# Patient Record
Sex: MALE | Race: WHITE | NOT HISPANIC OR LATINO | Employment: UNEMPLOYED | URBAN - METROPOLITAN AREA
[De-identification: names, ages, dates, MRNs, and addresses within clinical notes are randomized per-mention and may not be internally consistent; named-entity substitution may affect disease eponyms.]

---
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
## 2024-06-16 ENCOUNTER — APPOINTMENT (EMERGENCY)
Dept: ULTRASOUND IMAGING | Facility: HOSPITAL | Age: 19
End: 2024-06-16
Payer: COMMERCIAL

## 2024-06-16 ENCOUNTER — HOSPITAL ENCOUNTER (EMERGENCY)
Facility: HOSPITAL | Age: 19
Discharge: HOME | End: 2024-06-16
Attending: EMERGENCY MEDICINE
Payer: COMMERCIAL

## 2024-06-16 VITALS
WEIGHT: 190.2 LBS | HEIGHT: 69 IN | TEMPERATURE: 98.1 F | HEART RATE: 66 BPM | SYSTOLIC BLOOD PRESSURE: 153 MMHG | DIASTOLIC BLOOD PRESSURE: 83 MMHG | OXYGEN SATURATION: 100 % | RESPIRATION RATE: 16 BRPM | BODY MASS INDEX: 28.17 KG/M2

## 2024-06-16 DIAGNOSIS — N50.3 EPIDIDYMAL CYST: Primary | ICD-10-CM

## 2024-06-16 LAB
BACTERIA UR QL AUTO: ABNORMAL /HPF
BILIRUB UR QL STRIP: NEGATIVE
CAOX CRY URNS QL MICRO: ABNORMAL /HPF
CLARITY UR: CLEAR
COLOR UR: YELLOW
GLUCOSE UR STRIP-MCNC: NEGATIVE MG/DL
HGB UR QL STRIP.AUTO: NEGATIVE
KETONES UR STRIP-MCNC: NEGATIVE MG/DL
LEUKOCYTE ESTERASE UR QL STRIP: NEGATIVE
MUCOUS THREADS UR QL AUTO: ABNORMAL
NITRITE UR QL STRIP: NEGATIVE
NON-SQ EPI CELLS URNS QL MICRO: ABNORMAL /HPF
PH UR STRIP.AUTO: 6 [PH]
PROT UR STRIP-MCNC: ABNORMAL MG/DL
RBC #/AREA URNS AUTO: ABNORMAL /HPF
SP GR UR STRIP.AUTO: 1.03 (ref 1–1.03)
UROBILINOGEN UR STRIP-ACNC: <2 MG/DL
WBC #/AREA URNS AUTO: ABNORMAL /HPF

## 2024-06-16 PROCEDURE — 99284 EMERGENCY DEPT VISIT MOD MDM: CPT

## 2024-06-16 PROCEDURE — 99284 EMERGENCY DEPT VISIT MOD MDM: CPT | Performed by: EMERGENCY MEDICINE

## 2024-06-16 PROCEDURE — 81001 URINALYSIS AUTO W/SCOPE: CPT | Performed by: EMERGENCY MEDICINE

## 2024-06-16 PROCEDURE — 76870 US EXAM SCROTUM: CPT

## 2024-06-16 RX ORDER — IBUPROFEN 600 MG/1
600 TABLET ORAL EVERY 8 HOURS PRN
Qty: 30 TABLET | Refills: 0 | Status: SHIPPED | OUTPATIENT
Start: 2024-06-16

## 2024-06-16 RX ORDER — IBUPROFEN 600 MG/1
600 TABLET ORAL ONCE
Status: COMPLETED | OUTPATIENT
Start: 2024-06-16 | End: 2024-06-16

## 2024-06-16 RX ADMIN — IBUPROFEN 600 MG: 600 TABLET, FILM COATED ORAL at 20:18

## 2024-06-16 NOTE — ED PROVIDER NOTES
"Pt Name: Dixon Buck  MRN: [id number]  Birthdate 2005  Age/Sex: 19 y.o. male  Date of evaluation: 6/16/2024  PCP: No primary care provider on file.    CHIEF COMPLAINT    Chief Complaint   Patient presents with    Penis / Scrotum Problem     Reports a \"ball on my right ball.\" Reports been there 3+ mo but today it is painful. C/o \"dribbling\" when urinating.          HPI and MDM    19 y.o. male presenting with right scrotal mass.  States he has been feeling it for 2 to 3 years, thinks it has gotten bigger since then.  Over the last week he has been causing pain.  States he thinks he may have hit it but does not recall any inciting trauma.  Denies any scrotal swelling or skin changes.  He denies any urinary symptoms to me.  No abdominal pain, no fevers or chills.      Differential diagnosis considered includes but not limited to mass, varicocele, hydrocele, cyst.    UA not concerning for infection.  Ultrasound results as below.  Patient updated with all results.  Advise urology follow-up, return precautions were discussed.            Medications   ibuprofen (MOTRIN) tablet 600 mg (600 mg Oral Given 6/16/24 2018)         Past Medical and Surgical History    History reviewed. No pertinent past medical history.    History reviewed. No pertinent surgical history.    History reviewed. No pertinent family history.    Social History     Tobacco Use    Smoking status: Never    Smokeless tobacco: Never   Vaping Use    Vaping status: Never Used   Substance Use Topics    Alcohol use: Never    Drug use: Not Currently           Allergies    No Known Allergies    Home Medications    Prior to Admission medications    Medication Sig Start Date End Date Taking? Authorizing Provider   Multiple Vitamins-Minerals (AIRBORNE PO) Take by mouth    Historical Provider, MD           Physical Exam      ED Triage Vitals [06/16/24 1736]   Temperature Pulse Respirations Blood Pressure SpO2   98.1 °F (36.7 °C) 66 16 153/83 100 %      Temp " Source Heart Rate Source Patient Position - Orthostatic VS BP Location FiO2 (%)   Oral Monitor Sitting Left arm --      Pain Score       --               Physical Exam  Constitutional:       General: He is not in acute distress.     Appearance: He is not ill-appearing.   HENT:      Head: Normocephalic and atraumatic.      Nose: Nose normal.      Mouth/Throat:      Mouth: Mucous membranes are moist.   Eyes:      Extraocular Movements: Extraocular movements intact.      Pupils: Pupils are equal, round, and reactive to light.   Cardiovascular:      Rate and Rhythm: Normal rate and regular rhythm.   Pulmonary:      Effort: Pulmonary effort is normal. No respiratory distress.   Abdominal:      General: There is no distension.   Genitourinary:     Testes: Normal.      Comments: Normal testicular lie, separate from the right testicle there is a small rounded mass that is palpated, patient states it is tender.  Musculoskeletal:         General: No swelling or deformity. Normal range of motion.      Cervical back: Normal range of motion and neck supple.   Skin:     General: Skin is warm.      Findings: No erythema.   Neurological:      Mental Status: He is alert and oriented to person, place, and time. Mental status is at baseline.              Diagnostic Results      Labs:    Results Reviewed       Procedure Component Value Units Date/Time    Urine Microscopic [[id number]]  (Abnormal) Collected: 06/16/24 1918    Lab Status: Final result Specimen: Urine, Clean Catch Updated: 06/16/24 1949     RBC, UA None Seen /hpf      WBC, UA 1-2 /hpf      Epithelial Cells None Seen /hpf      Bacteria, UA None Seen /hpf      MUCUS THREADS Moderate     Ca Oxalate Raquel, UA Occasional /hpf     UA (URINE) with reflex to Scope [[id number]]  (Abnormal) Collected: 06/16/24 1918    Lab Status: Final result Specimen: Urine, Clean Catch Updated: 06/16/24 1942     Color, UA Yellow     Clarity, UA Clear     Specific Gravity, UA 1.032     pH, UA 6.0      Leukocytes, UA Negative     Nitrite, UA Negative     Protein, UA Trace mg/dl      Glucose, UA Negative mg/dl      Ketones, UA Negative mg/dl      Urobilinogen, UA <2.0 mg/dl      Bilirubin, UA Negative     Occult Blood, UA Negative            All labs reviewed and utilized in the medical decision making process    Radiology:    US scrotum and testicles   Final Result      Corresponding to the palpable abnormality is a epididymal head cyst measuring 1.0 x 1.5 x 0.9 cm.      Normal testicles.         Workstation performed: [id number]             All radiology studies independently viewed by me and interpreted by the radiologist.    Procedure    Procedures        FINAL IMPRESSION    Final diagnoses:   Epididymal cyst         DISPOSITION    Time reflects when diagnosis was documented in both MDM as applicable and the Disposition within this note       Time User Action Codes Description Comment    6/16/2024  8:05 PM Linda Garcia Add [N50.3] Epididymal cyst           ED Disposition       ED Disposition   Discharge    Condition   Stable    Date/Time   Sun Jun 16, 2024  8:05 PM    Comment   Dixon Buck discharge to home/self care.                   Follow-up Information       Follow up With Specialties Details Why Contact Info Additional Information    [facility] [place] [facility] Call in 1 day  [address]  [address]  [facility] [address]  [phone] [facility] [place], [address], [address], [place], [place], [address]   [phone]              PATIENT REFERRED TO:    [facility] [place]  [address]  [address]  [facility] [address]  [phone]  Call in 1 day        DISCHARGE MEDICATIONS:    Discharge Medication List as of 6/16/2024  8:06 PM        CONTINUE these medications which have NOT CHANGED    Details   Multiple Vitamins-Minerals (AIRBORNE PO) Take by mouth, Historical Med             No discharge procedures  on file.         Linda Garcia DO        This note was partially completed using voice recognition technology, and was scanned for gross errors; however some errors may still exist. Please contact the author with any questions or requests for clarification.      Linda Garcia DO  06/17/24 3676

## 2024-06-20 ENCOUNTER — OFFICE VISIT (OUTPATIENT)
Dept: INTERNAL MEDICINE CLINIC | Facility: CLINIC | Age: 19
End: 2024-06-20
Payer: COMMERCIAL

## 2024-06-20 ENCOUNTER — APPOINTMENT (OUTPATIENT)
Dept: LAB | Facility: HOSPITAL | Age: 19
End: 2024-06-20
Payer: COMMERCIAL

## 2024-06-20 VITALS
OXYGEN SATURATION: 97 % | SYSTOLIC BLOOD PRESSURE: 114 MMHG | WEIGHT: 191.2 LBS | HEIGHT: 69 IN | HEART RATE: 67 BPM | DIASTOLIC BLOOD PRESSURE: 72 MMHG | BODY MASS INDEX: 28.32 KG/M2 | TEMPERATURE: 98.1 F | RESPIRATION RATE: 18 BRPM

## 2024-06-20 DIAGNOSIS — R10.31 RLQ ABDOMINAL PAIN: Primary | ICD-10-CM

## 2024-06-20 DIAGNOSIS — Z13.6 SCREENING FOR HEART DISEASE: ICD-10-CM

## 2024-06-20 DIAGNOSIS — R10.31 RIGHT INGUINAL PAIN: ICD-10-CM

## 2024-06-20 DIAGNOSIS — R10.31 RLQ ABDOMINAL PAIN: ICD-10-CM

## 2024-06-20 DIAGNOSIS — R10.32 LLQ ABDOMINAL PAIN: ICD-10-CM

## 2024-06-20 DIAGNOSIS — R10.2 SUPRAPUBIC PAIN: ICD-10-CM

## 2024-06-20 LAB
ALBUMIN SERPL BCG-MCNC: 4.6 G/DL (ref 3.5–5)
ALP SERPL-CCNC: 73 U/L (ref 34–104)
ALT SERPL W P-5'-P-CCNC: 21 U/L (ref 7–52)
ANION GAP SERPL CALCULATED.3IONS-SCNC: 6 MMOL/L (ref 4–13)
AST SERPL W P-5'-P-CCNC: 14 U/L (ref 13–39)
BASOPHILS # BLD AUTO: 0.03 THOUSANDS/ÂΜL (ref 0–0.1)
BASOPHILS NFR BLD AUTO: 1 % (ref 0–1)
BILIRUB SERPL-MCNC: 0.67 MG/DL (ref 0.2–1)
BUN SERPL-MCNC: 14 MG/DL (ref 5–25)
CALCIUM SERPL-MCNC: 9.6 MG/DL (ref 8.4–10.2)
CHLORIDE SERPL-SCNC: 104 MMOL/L (ref 96–108)
CHOLEST SERPL-MCNC: 135 MG/DL
CO2 SERPL-SCNC: 28 MMOL/L (ref 21–32)
CREAT SERPL-MCNC: 0.96 MG/DL (ref 0.6–1.3)
EOSINOPHIL # BLD AUTO: 0.15 THOUSAND/ÂΜL (ref 0–0.61)
EOSINOPHIL NFR BLD AUTO: 3 % (ref 0–6)
ERYTHROCYTE [DISTWIDTH] IN BLOOD BY AUTOMATED COUNT: 11.8 % (ref 11.6–15.1)
GFR SERPL CREATININE-BSD FRML MDRD: 112 ML/MIN/1.73SQ M
GLUCOSE P FAST SERPL-MCNC: 95 MG/DL (ref 65–99)
HCT VFR BLD AUTO: 42.8 % (ref 36.5–49.3)
HDLC SERPL-MCNC: 35 MG/DL
HGB BLD-MCNC: 14.8 G/DL (ref 12–17)
IMM GRANULOCYTES # BLD AUTO: 0.01 THOUSAND/UL (ref 0–0.2)
IMM GRANULOCYTES NFR BLD AUTO: 0 % (ref 0–2)
LDLC SERPL CALC-MCNC: 62 MG/DL (ref 0–100)
LYMPHOCYTES # BLD AUTO: 1.84 THOUSANDS/ÂΜL (ref 0.6–4.47)
LYMPHOCYTES NFR BLD AUTO: 37 % (ref 14–44)
MCH RBC QN AUTO: 30.1 PG (ref 26.8–34.3)
MCHC RBC AUTO-ENTMCNC: 34.6 G/DL (ref 31.4–37.4)
MCV RBC AUTO: 87 FL (ref 82–98)
MONOCYTES # BLD AUTO: 0.52 THOUSAND/ÂΜL (ref 0.17–1.22)
MONOCYTES NFR BLD AUTO: 10 % (ref 4–12)
NEUTROPHILS # BLD AUTO: 2.48 THOUSANDS/ÂΜL (ref 1.85–7.62)
NEUTS SEG NFR BLD AUTO: 49 % (ref 43–75)
NONHDLC SERPL-MCNC: 100 MG/DL
NRBC BLD AUTO-RTO: 0 /100 WBCS
PLATELET # BLD AUTO: 290 THOUSANDS/UL (ref 149–390)
PMV BLD AUTO: 10.6 FL (ref 8.9–12.7)
POTASSIUM SERPL-SCNC: 4.3 MMOL/L (ref 3.5–5.3)
PROT SERPL-MCNC: 7.5 G/DL (ref 6.4–8.4)
RBC # BLD AUTO: 4.92 MILLION/UL (ref 3.88–5.62)
SODIUM SERPL-SCNC: 138 MMOL/L (ref 135–147)
TRIGL SERPL-MCNC: 190 MG/DL
WBC # BLD AUTO: 5.03 THOUSAND/UL (ref 4.31–10.16)

## 2024-06-20 PROCEDURE — 80053 COMPREHEN METABOLIC PANEL: CPT

## 2024-06-20 PROCEDURE — 99203 OFFICE O/P NEW LOW 30 MIN: CPT | Performed by: PHYSICIAN ASSISTANT

## 2024-06-20 PROCEDURE — 80061 LIPID PANEL: CPT

## 2024-06-20 PROCEDURE — 85025 COMPLETE CBC W/AUTO DIFF WBC: CPT

## 2024-06-20 PROCEDURE — 36415 COLL VENOUS BLD VENIPUNCTURE: CPT

## 2024-06-20 NOTE — PATIENT INSTRUCTIONS
Currently inguinal pain is present on the suprapubic ramus.  Also present in both lower quadrants.  Suspect right inguinal hernia.  Will have labs checked, will refer to general surgery, will have CT of the abdomen and pelvis done.  We will discuss all results when available.  Schedule follow-up 1 month, sooner as needed.  Give trial of Advil, 2 tablets 3 times a day for 5 days.

## 2024-06-20 NOTE — PROGRESS NOTES
"Assessment/Plan:   Patient Instructions   Currently inguinal pain is present on the suprapubic ramus.  Also present in both lower quadrants.  Suspect right inguinal hernia.  Will have labs checked, will refer to general surgery, will have CT of the abdomen and pelvis done.  We will discuss all results when available.  Schedule follow-up 1 month, sooner as needed.  Give trial of Advil, 2 tablets 3 times a day for 5 days.    Quality Measures:   Depression Screening and Follow-up Plan: Patient was screened for depression during today's encounter. They screened negative with a PHQ-2 score of 2.         Return in about 4 weeks (around 7/18/2024) for Next scheduled follow up.         Diagnoses and all orders for this visit:    RLQ abdominal pain  -     CBC and differential; Future  -     Comprehensive metabolic panel; Future  -     CT abdomen pelvis w wo contrast; Future    LLQ abdominal pain  -     CT abdomen pelvis w wo contrast; Future    Suprapubic pain  -     CT abdomen pelvis w wo contrast; Future    Screening for heart disease  -     Lipid panel; Future    Right inguinal pain  -     Ambulatory referral to General Surgery; Future          Subjective:      Patient ID: Dixon Buck is a 19 y.o. male.    19-year-old male presents to establish with this practice but for an acute concern of right inguinal pain, right testicular pain he is stating for \"a long time\".  States he is sexually active and pain is present during and after intercourse.  He has no symptoms of STD.  He has had 2 ultrasounds done, 1 in 2021, and 1 recently at the ER.  Both  small epididymal cysts but the testicles look normal.  No dysuria, no gross hematuria, no urgency or frequency.  On further questioning patient does ride dirt bikes frequently, and notes pain after doing so.    Remainder of health history is unremarkable.    History/PE as documented in the EMR.        ALLERGIES:  No Known Allergies    CURRENT MEDICATIONS:    Current " Outpatient Medications:     ibuprofen (MOTRIN) 600 mg tablet, Take 1 tablet (600 mg total) by mouth every 8 (eight) hours as needed for moderate pain, Disp: 30 tablet, Rfl: 0    Multiple Vitamins-Minerals (AIRBORNE PO), Take by mouth, Disp: , Rfl:     ACTIVE PROBLEM LIST:  There is no problem list on file for this patient.      PAST MEDICAL HISTORY:  History reviewed. No pertinent past medical history.    PAST SURGICAL HISTORY:  History reviewed. No pertinent surgical history.    FAMILY HISTORY:  Family History   Problem Relation Age of Onset    No Known Problems Mother     No Known Problems Father     No Known Problems Brother     Colon cancer Neg Hx     Breast cancer Neg Hx     Prostate cancer Neg Hx        SOCIAL HISTORY:  Social History     Socioeconomic History    Marital status: Single     Spouse name: Not on file    Number of children: Not on file    Years of education: Not on file    Highest education level: Not on file   Occupational History    Not on file   Tobacco Use    Smoking status: Never    Smokeless tobacco: Never   Vaping Use    Vaping status: Never Used   Substance and Sexual Activity    Alcohol use: Never    Drug use: Not Currently    Sexual activity: Yes     Partners: Female   Other Topics Concern    Not on file   Social History Narrative    Girlfriend    Brushes teeth twice daily    Hobbies-computers, riding dirt bikes     Social Determinants of Health     Financial Resource Strain: Not on file   Food Insecurity: Not on file   Transportation Needs: Not on file   Physical Activity: Not on file   Stress: Not on file   Social Connections: Not on file   Intimate Partner Violence: Not on file   Housing Stability: Not on file       Review of Systems   Constitutional:  Negative for activity change, chills, fatigue and fever.   HENT:  Negative for congestion.    Eyes:  Negative for discharge.   Respiratory:  Negative for cough, chest tightness and shortness of breath.    Cardiovascular:  Negative for  "chest pain, palpitations and leg swelling.   Gastrointestinal:  Negative for abdominal pain, blood in stool, constipation, diarrhea, nausea and vomiting.   Endocrine: Negative for polydipsia, polyphagia and polyuria.   Genitourinary:  Positive for testicular pain. Negative for difficulty urinating, dysuria, flank pain, frequency, hematuria, penile discharge, penile pain, penile swelling, scrotal swelling and urgency.   Musculoskeletal:  Negative for arthralgias and myalgias.   Skin:  Negative for rash.   Allergic/Immunologic: Negative for immunocompromised state.   Neurological:  Negative for dizziness, syncope, weakness, light-headedness and headaches.   Hematological:  Negative for adenopathy. Does not bruise/bleed easily.   Psychiatric/Behavioral:  Negative for dysphoric mood, sleep disturbance and suicidal ideas. The patient is not nervous/anxious.          Objective:  Vitals:    06/20/24 0830   BP: 114/72   BP Location: Right arm   Patient Position: Sitting   Cuff Size: Standard   Pulse: 67   Resp: 18   Temp: 98.1 °F (36.7 °C)   TempSrc: Tympanic   SpO2: 97%   Weight: 86.7 kg (191 lb 3.2 oz)   Height: 5' 9\" (1.753 m)     Body mass index is 28.24 kg/m².     Physical Exam  Vitals and nursing note reviewed.   Constitutional:       General: He is not in acute distress.     Appearance: Normal appearance. He is well-developed.   HENT:      Head: Normocephalic.      Right Ear: Tympanic membrane, ear canal and external ear normal.      Left Ear: Tympanic membrane, ear canal and external ear normal.      Nose: Nose normal.      Mouth/Throat:      Mouth: Mucous membranes are moist.      Pharynx: Oropharynx is clear. No oropharyngeal exudate.   Eyes:      General: No scleral icterus.        Right eye: No discharge.         Left eye: No discharge.      Extraocular Movements: Extraocular movements intact.      Conjunctiva/sclera: Conjunctivae normal.      Pupils: Pupils are equal, round, and reactive to light.   Neck:      " Thyroid: No thyromegaly.      Vascular: No carotid bruit or JVD.      Trachea: No tracheal deviation.   Cardiovascular:      Rate and Rhythm: Normal rate and regular rhythm.      Pulses: Normal pulses.      Heart sounds: Normal heart sounds. No murmur heard.     No friction rub. No gallop.   Pulmonary:      Effort: Pulmonary effort is normal. No respiratory distress.      Breath sounds: Normal breath sounds. No wheezing or rales.   Chest:      Chest wall: No tenderness.   Abdominal:      General: Bowel sounds are normal. There is no distension.      Palpations: Abdomen is soft. There is no hepatomegaly, splenomegaly or mass.      Tenderness: There is abdominal tenderness in the right lower quadrant and left lower quadrant. There is no right CVA tenderness, left CVA tenderness, guarding or rebound.      Hernia: A hernia is present. Hernia is present in the right inguinal area.   Genitourinary:     Penis: Normal.       Testes: Normal.      Comments: Acute tenderness on pressing the pubic ramus.  Open left inguinal ring.  Questionable right inguinal hernia  Musculoskeletal:         General: No tenderness or deformity. Normal range of motion.      Cervical back: Normal range of motion and neck supple.      Right lower leg: No edema.      Left lower leg: No edema.   Lymphadenopathy:      Cervical: No cervical adenopathy.   Skin:     General: Skin is warm and dry.      Findings: No rash.   Neurological:      General: No focal deficit present.      Mental Status: He is alert and oriented to person, place, and time.      Cranial Nerves: No cranial nerve deficit.      Sensory: No sensory deficit.      Motor: No weakness or abnormal muscle tone.      Coordination: Coordination normal.      Gait: Gait normal.      Deep Tendon Reflexes: Reflexes are normal and symmetric. Reflexes normal.   Psychiatric:         Mood and Affect: Mood normal.         Behavior: Behavior normal.         Thought Content: Thought content normal.        "  Judgment: Judgment normal.           RESULTS:  No results found for: \"HGBA1C\", \"CHOLESTEROL\", \"TRIG\", \"HDL\", \"LDLCALC\", \"HGB\", \"HCT\", \"PLT\", \"PSA\", \"[id number]\", \"FREET4\", \"SODIUM\", \"BUN\", \"CREATININE\"   In chart    This note was created with voice recognition software.  Phonic, grammatical and spelling errors may be present within the note as a result.    "

## 2024-06-26 ENCOUNTER — HOSPITAL ENCOUNTER (OUTPATIENT)
Dept: CT IMAGING | Facility: HOSPITAL | Age: 19
Discharge: HOME | End: 2024-06-26
Payer: COMMERCIAL

## 2024-06-26 DIAGNOSIS — R10.32 LLQ ABDOMINAL PAIN: ICD-10-CM

## 2024-06-26 DIAGNOSIS — R10.31 RLQ ABDOMINAL PAIN: ICD-10-CM

## 2024-06-26 DIAGNOSIS — R10.2 SUPRAPUBIC PAIN: ICD-10-CM

## 2024-06-26 PROCEDURE — 74177 CT ABD & PELVIS W/CONTRAST: CPT

## 2024-06-26 RX ADMIN — IOHEXOL 100 ML: 350 INJECTION, SOLUTION INTRAVENOUS at 12:56

## 2024-06-27 ENCOUNTER — CONSULT (OUTPATIENT)
Dept: SURGERY | Facility: CLINIC | Age: 19
End: 2024-06-27
Payer: COMMERCIAL

## 2024-06-27 VITALS
DIASTOLIC BLOOD PRESSURE: 78 MMHG | WEIGHT: 189 LBS | OXYGEN SATURATION: 98 % | HEIGHT: 69 IN | TEMPERATURE: 97.7 F | BODY MASS INDEX: 27.99 KG/M2 | HEART RATE: 67 BPM | SYSTOLIC BLOOD PRESSURE: 142 MMHG | RESPIRATION RATE: 18 BRPM

## 2024-06-27 DIAGNOSIS — R10.31 RIGHT INGUINAL PAIN: Primary | ICD-10-CM

## 2024-06-27 DIAGNOSIS — N50.811 PAIN IN RIGHT TESTICLE: ICD-10-CM

## 2024-06-27 PROCEDURE — 99243 OFF/OP CNSLTJ NEW/EST LOW 30: CPT | Performed by: STUDENT IN AN ORGANIZED HEALTH CARE EDUCATION/TRAINING PROGRAM

## 2024-06-27 NOTE — PROGRESS NOTES
Assessment/Plan:  19-year-old male with right testicle pain  -Patient has developed pain in his right testicle that sometimes seems to radiate into his abdomen  -At certain times states he is not sure if the pain starts in his abdomen or starts in the testicle and moves in the other direction  -Denies any bulge in the groin  -Has been tolerating a diet and having bowel function  -On exam no inguinal hernia palpated in the bilateral groin  -CBC and CMP from 6/20/2024 reviewed  -CT scan of the abdomen and pelvis from 6/26/2024 report and images reviewed, this was also reviewed with the patient  -Ultrasound of the scrotum and testicles from 6/16/2024 report reviewed  -Emergency department note from 6/16/2024 reviewed  -Primary care physician note from 6/20/2024 reviewed  -No inguinal hernia seen on CT or noted on exam  -Recommend patient to follow-up with Urology for evaluation of testicular pain, epididymal cyst noted on ultrasound  -Follow-up in office as needed     1. Right inguinal pain  -     Ambulatory referral to General Surgery  2. Pain in right testicle             Subjective:      Patient ID: Dixon Buck is a 19 y.o. male.    Triage Notes:    Patient is a 19-year-old male who presents the office for evaluation of right testicle pain.  States he recently developed pain in his right testicle and also in his lower abdomen.  States he is unsure if the pain radiates from the testicle up or the abdomen down.  Denies any bulge in the right groin.  Has been tolerating a diet and having bowel function.        The following portions of the patient's history were reviewed and updated as appropriate:   He  has no past medical history on file.  He   Patient Active Problem List    Diagnosis Date Noted    Right inguinal pain 06/27/2024    Pain in right testicle 06/27/2024     He  has no past surgical history on file.  His family history includes No Known Problems in his brother, father, and mother.  He  reports that he  "has never smoked. He has never been exposed to tobacco smoke. He has never used smokeless tobacco. He reports current alcohol use. He reports current drug use. Drug: Marijuana.  Current Outpatient Medications on File Prior to Visit   Medication Sig    ibuprofen (MOTRIN) 600 mg tablet Take 1 tablet (600 mg total) by mouth every 8 (eight) hours as needed for moderate pain    Multiple Vitamins-Minerals (AIRBORNE PO) Take by mouth     No current facility-administered medications on file prior to visit.     He has No Known Allergies..    Review of Systems   Constitutional:  Negative for chills, fatigue and fever.   HENT:  Negative for congestion, hearing loss, rhinorrhea and sore throat.    Eyes:  Negative for pain and discharge.   Respiratory:  Negative for cough, chest tightness and shortness of breath.    Cardiovascular:  Negative for chest pain and palpitations.   Gastrointestinal:  Positive for abdominal pain. Negative for constipation, diarrhea, nausea and vomiting.   Endocrine: Negative for cold intolerance and heat intolerance.   Genitourinary:  Positive for testicular pain. Negative for difficulty urinating and dysuria.   Musculoskeletal:  Negative for back pain and neck pain.   Skin:  Negative for color change and rash.   Allergic/Immunologic: Negative for environmental allergies and food allergies.   Neurological:  Negative for seizures and headaches.   Hematological:  Negative for adenopathy. Does not bruise/bleed easily.   Psychiatric/Behavioral:  Negative for confusion and hallucinations.          Objective:      /78 (BP Location: Right arm, Patient Position: Sitting, Cuff Size: Standard)   Pulse 67   Temp 97.7 °F (36.5 °C)   Resp 18   Ht 5' 9\" (1.753 m)   Wt 85.7 kg (189 lb)   SpO2 98%   BMI 27.91 kg/m²     Below is the patient's most recent value for Albumin, ALT, AST, BUN, Calcium, Chloride, Cholesterol, CO2, Creatinine, GFR, Glucose, HDL, Hematocrit, Hemoglobin, Hemoglobin A1C, LDL, " Magnesium, Phosphorus, Platelets, Potassium, PSA, Sodium, Triglycerides, and WBC.   Lab Results   Component Value Date    ALT 21 06/20/2024    AST 14 06/20/2024    BUN 14 06/20/2024    CALCIUM 9.6 06/20/2024     06/20/2024    CO2 28 06/20/2024    CREATININE 0.96 06/20/2024    HDL 35 (L) 06/20/2024    HCT 42.8 06/20/2024    HGB 14.8 06/20/2024     06/20/2024    K 4.3 06/20/2024    TRIG 190 (H) 06/20/2024    WBC 5.03 06/20/2024     Note: for a comprehensive list of the patient's lab results, access the Results Review activity.     Physical Exam  Constitutional:       Appearance: Normal appearance.   HENT:      Head: Normocephalic and atraumatic.      Nose: Nose normal.   Eyes:      General: No scleral icterus.     Conjunctiva/sclera: Conjunctivae normal.   Cardiovascular:      Rate and Rhythm: Normal rate and regular rhythm.      Heart sounds: Normal heart sounds.   Pulmonary:      Effort: Pulmonary effort is normal.      Breath sounds: Normal breath sounds.   Abdominal:      General: There is no distension.      Palpations: Abdomen is soft.      Tenderness: There is no abdominal tenderness.      Comments: No inguinal hernia palpated in the bilateral groin, patient examined in both standing and supine position   Musculoskeletal:         General: No signs of injury.   Skin:     General: Skin is warm.      Coloration: Skin is not jaundiced.   Neurological:      General: No focal deficit present.      Mental Status: He is alert and oriented to person, place, and time.   Psychiatric:         Mood and Affect: Mood normal.         Behavior: Behavior normal.

## 2024-07-23 NOTE — PROGRESS NOTES
7/24/2024      Chief Complaint   Patient presents with    Testicle Pain       Assessment and Plan    19 y.o. male new patient     Right testicular pain  2.   Epididymal cyst  - US scrotum/testicles from 6/16/24 - Corresponding to the palpable abnormality is a epididymal head cyst measuring 1.0 x 1.5 x 0.9 cm. Normal testicles.  -Urine dip today negative  - Exam today unremarkable aside from small right epididymal cyst  - Will trial 2 week course of Naproxen   - Recommend scrotal elevation/supportive underwear  - Proceed with right spermatic cord block if persistent pain despite this    3. Dysuria/LUTS  - C/o penile discharge and weak urinary stream as well  - STD panel ordered  - Recommend adequate hydration and avoiding bladder irritants  - Will start empiric Doxycycline course x1 week  - Rx for pyridium PRN  - Proceed with cystoscopy to r/o stricture if persistent symptoms.     History of Present Illness  Dixon Buck is a 19 y.o. male here for new patient evaluation of right testicular pain. He was seen in the ED on 6/16/24 with right scrotal mass and pain. Urine testing was negative for infection. US was negative for acute findings and showed an epididymal head cyst. He was treated with course of ibuprofen. He recently also complained of lower abdominal pain as well. CT A/P also unremarkable. He complains of ongoing intermittent right testicular pain. Also feels it is swollen and red. Reports pain is worse with activity and sitting. Denies any inciting injury or trauma. He also complains of dysuria, clear penile discharge, and weak urinary stream. No recent STD testing. No prior  surgical manipulation.       Review of Systems   Constitutional:  Negative for chills and fever.   Respiratory:  Negative for shortness of breath.    Cardiovascular:  Negative for chest pain.   Gastrointestinal:  Negative for abdominal pain.   Genitourinary:  Positive for dysuria, penile discharge, scrotal swelling and testicular  pain. Negative for difficulty urinating, flank pain, frequency, genital sores, hematuria, penile pain, penile swelling and urgency.   Neurological:  Negative for dizziness.       Past Medical History  History reviewed. No pertinent past medical history.    Past Social History  History reviewed. No pertinent surgical history.  Social History     Tobacco Use   Smoking Status Never    Passive exposure: Never   Smokeless Tobacco Never       Past Family History  Family History   Problem Relation Age of Onset    No Known Problems Father     No Known Problems Mother     No Known Problems Brother     Colon cancer Neg Hx     Breast cancer Neg Hx     Prostate cancer Neg Hx        Past Social history  Social History     Socioeconomic History    Marital status: Single     Spouse name: Not on file    Number of children: Not on file    Years of education: Not on file    Highest education level: Not on file   Occupational History    Not on file   Tobacco Use    Smoking status: Never     Passive exposure: Never    Smokeless tobacco: Never   Vaping Use    Vaping status: Never Used   Substance and Sexual Activity    Alcohol use: Yes     Comment: ocass    Drug use: Yes     Types: Marijuana    Sexual activity: Yes     Partners: Female   Other Topics Concern    Not on file   Social History Narrative    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    Brushes teeth twice daily    Hobbies-computers, riding dirt bikes     Social Determinants of Health     Financial Resource Strain: Not on file   Food Insecurity: Not on file   Transportation Needs: Not on file   Physical Activity: Not on file   Stress: Not on file   Social Connections: Not on file   Intimate Partner Violence: Not on file   Housing Stability: Not on file       Current Medications  Current Outpatient Medications   Medication Sig Dispense Refill    ibuprofen (MOTRIN) 600 mg tablet Take 1 tablet (600 mg total) by mouth every 8 (eight) hours as needed for moderate pain 30 tablet 0    Multiple Vitamins-Minerals  "(AIRBORNE PO) Take by mouth       No current facility-administered medications for this visit.       Allergies  No Known Allergies      The following portions of the patient's history were reviewed and updated as appropriate: allergies, current medications, past medical history, past social history, past surgical history and problem list.      Vitals  Vitals:    07/24/24 0828   BP: 140/76   Pulse: 80   Temp: 97.7 °F (36.5 °C)   TempSrc: Temporal   SpO2: 98%   Weight: 83.5 kg (184 lb)   Height: 5' 9\" (1.753 m)           Physical Exam  Physical Exam  Constitutional:       Appearance: Normal appearance.   HENT:      Head: Normocephalic and atraumatic.      Right Ear: External ear normal.      Left Ear: External ear normal.      Nose: Nose normal.   Eyes:      General: No scleral icterus.     Conjunctiva/sclera: Conjunctivae normal.   Cardiovascular:      Pulses: Normal pulses.   Pulmonary:      Effort: Pulmonary effort is normal.   Genitourinary:     Penis: Normal.       Testes: Normal.      Comments: No penile rashes or lesions. Circumcised. Testes descended and symmetric. No scrotal edema or erythema. Small right epididymal cyst. No intratesticular masses.   Musculoskeletal:         General: Normal range of motion.      Cervical back: Normal range of motion.   Skin:     General: Skin is warm and dry.      Findings: No rash.   Neurological:      General: No focal deficit present.      Mental Status: He is alert and oriented to person, place, and time.   Psychiatric:         Mood and Affect: Mood normal.         Behavior: Behavior normal.         Thought Content: Thought content normal.         Judgment: Judgment normal.           Results  Recent Results (from the past 1 hour(s))   POCT urine dip    Collection Time: 07/24/24  8:31 AM   Result Value Ref Range    LEUKOCYTE ESTERASE,UA -     NITRITE,UA -     SL AMB POCT UROBILINOGEN 0.2     POCT URINE PROTEIN -      PH,UA 5.0     BLOOD,UA -     SPECIFIC GRAVITY,UA 1.030  " "   KETONES,UA -     BILIRUBIN,UA -     GLUCOSE, UA -      COLOR,UA Yellow     CLARITY,UA Clear    ]  No results found for: \"PSA\"  Lab Results   Component Value Date    CALCIUM 9.6 06/20/2024    K 4.3 06/20/2024    CO2 28 06/20/2024     06/20/2024    BUN 14 06/20/2024    CREATININE 0.96 06/20/2024     Lab Results   Component Value Date    WBC 5.03 06/20/2024    HGB 14.8 06/20/2024    HCT 42.8 06/20/2024    MCV 87 06/20/2024     06/20/2024           Orders  Orders Placed This Encounter   Procedures    POCT urine dip       Monique Sparrow      "

## 2024-07-24 ENCOUNTER — APPOINTMENT (OUTPATIENT)
Dept: LAB | Facility: HOSPITAL | Age: 19
End: 2024-07-24
Payer: COMMERCIAL

## 2024-07-24 ENCOUNTER — OFFICE VISIT (OUTPATIENT)
Dept: UROLOGY | Facility: CLINIC | Age: 19
End: 2024-07-24
Payer: COMMERCIAL

## 2024-07-24 VITALS
DIASTOLIC BLOOD PRESSURE: 76 MMHG | WEIGHT: 184 LBS | HEIGHT: 69 IN | TEMPERATURE: 97.7 F | OXYGEN SATURATION: 98 % | SYSTOLIC BLOOD PRESSURE: 140 MMHG | HEART RATE: 80 BPM | BODY MASS INDEX: 27.25 KG/M2

## 2024-07-24 DIAGNOSIS — R30.0 DYSURIA: ICD-10-CM

## 2024-07-24 DIAGNOSIS — N34.2 URETHRITIS: ICD-10-CM

## 2024-07-24 DIAGNOSIS — N50.811 TESTICULAR PAIN, RIGHT: Primary | ICD-10-CM

## 2024-07-24 LAB
HIV 1+2 AB+HIV1 P24 AG SERPL QL IA: NORMAL
HIV 2 AB SERPL QL IA: NORMAL
HIV1 AB SERPL QL IA: NORMAL
HIV1 P24 AG SERPL QL IA: NORMAL
SL AMB  POCT GLUCOSE, UA: NORMAL
SL AMB LEUKOCYTE ESTERASE,UA: NORMAL
SL AMB POCT BILIRUBIN,UA: NORMAL
SL AMB POCT BLOOD,UA: NORMAL
SL AMB POCT CLARITY,UA: CLEAR
SL AMB POCT COLOR,UA: YELLOW
SL AMB POCT KETONES,UA: NORMAL
SL AMB POCT NITRITE,UA: NORMAL
SL AMB POCT PH,UA: 5
SL AMB POCT SPECIFIC GRAVITY,UA: 1.03
SL AMB POCT URINE PROTEIN: NORMAL
SL AMB POCT UROBILINOGEN: 0.2
TREPONEMA PALLIDUM IGG+IGM AB [PRESENCE] IN SERUM OR PLASMA BY IMMUNOASSAY: NORMAL

## 2024-07-24 PROCEDURE — 87491 CHLMYD TRACH DNA AMP PROBE: CPT

## 2024-07-24 PROCEDURE — 87389 HIV-1 AG W/HIV-1&-2 AB AG IA: CPT | Performed by: PHYSICIAN ASSISTANT

## 2024-07-24 PROCEDURE — 87086 URINE CULTURE/COLONY COUNT: CPT | Performed by: PHYSICIAN ASSISTANT

## 2024-07-24 PROCEDURE — 81002 URINALYSIS NONAUTO W/O SCOPE: CPT | Performed by: PHYSICIAN ASSISTANT

## 2024-07-24 PROCEDURE — 86780 TREPONEMA PALLIDUM: CPT | Performed by: PHYSICIAN ASSISTANT

## 2024-07-24 PROCEDURE — 87591 N.GONORRHOEAE DNA AMP PROB: CPT

## 2024-07-24 PROCEDURE — 86696 HERPES SIMPLEX TYPE 2 TEST: CPT

## 2024-07-24 PROCEDURE — 86695 HERPES SIMPLEX TYPE 1 TEST: CPT

## 2024-07-24 PROCEDURE — 99204 OFFICE O/P NEW MOD 45 MIN: CPT | Performed by: PHYSICIAN ASSISTANT

## 2024-07-24 PROCEDURE — 36415 COLL VENOUS BLD VENIPUNCTURE: CPT | Performed by: PHYSICIAN ASSISTANT

## 2024-07-24 RX ORDER — NAPROXEN 500 MG/1
500 TABLET ORAL 2 TIMES DAILY WITH MEALS
Qty: 28 TABLET | Refills: 0 | Status: SHIPPED | OUTPATIENT
Start: 2024-07-24 | End: 2024-08-07

## 2024-07-24 RX ORDER — PHENAZOPYRIDINE HYDROCHLORIDE 100 MG/1
100 TABLET, FILM COATED ORAL 3 TIMES DAILY PRN
Qty: 10 TABLET | Refills: 0 | Status: SHIPPED | OUTPATIENT
Start: 2024-07-24

## 2024-07-24 RX ORDER — DOXYCYCLINE HYCLATE 100 MG/1
100 CAPSULE ORAL EVERY 12 HOURS SCHEDULED
Qty: 14 CAPSULE | Refills: 0 | Status: SHIPPED | OUTPATIENT
Start: 2024-07-24 | End: 2024-07-31

## 2024-07-25 LAB
BACTERIA UR CULT: NORMAL
C TRACH DNA SPEC QL NAA+PROBE: NEGATIVE
N GONORRHOEA DNA SPEC QL NAA+PROBE: NEGATIVE

## 2024-07-29 ENCOUNTER — TELEPHONE (OUTPATIENT)
Dept: UROLOGY | Facility: CLINIC | Age: 19
End: 2024-07-29

## 2024-07-29 LAB — SCAN RESULT: NORMAL

## 2024-08-08 ENCOUNTER — OFFICE VISIT (OUTPATIENT)
Dept: INTERNAL MEDICINE CLINIC | Facility: CLINIC | Age: 19
End: 2024-08-08
Payer: COMMERCIAL

## 2024-08-08 VITALS
BODY MASS INDEX: 27.28 KG/M2 | HEART RATE: 72 BPM | RESPIRATION RATE: 18 BRPM | TEMPERATURE: 98 F | SYSTOLIC BLOOD PRESSURE: 118 MMHG | OXYGEN SATURATION: 98 % | WEIGHT: 184.2 LBS | DIASTOLIC BLOOD PRESSURE: 80 MMHG | HEIGHT: 69 IN

## 2024-08-08 DIAGNOSIS — R10.31 RIGHT INGUINAL PAIN: Primary | ICD-10-CM

## 2024-08-08 PROCEDURE — 99213 OFFICE O/P EST LOW 20 MIN: CPT | Performed by: PHYSICIAN ASSISTANT

## 2024-08-08 NOTE — PATIENT INSTRUCTIONS
Contact urology for follow-up concerning ongoing pain.  Follow-up here as needed.  Can use OTC topical steroid cream on itchy rash on forearms.  Follow package instructions.

## 2024-08-08 NOTE — PROGRESS NOTES
Ambulatory Visit  Name: Dixon Buck      : 2005      MRN: [id number]  Encounter Provider: Rigoberto Kumar PA-C  Encounter Date: 2024   Encounter department: [facility] INTERNAL MEDICINE [place]    Assessment & Plan   1. Right inguinal pain      Depression Screening and Follow-up Plan: Patient was screened for depression during today's encounter. They screened negative with a PHQ-2 score of 0.      History of Present Illness     Follow-up, right inguinal pain    EMR reviewed.  Surgical note reviewed.  Surgery does not feel there is any surgical problem.    Urology note reviewed patient does have an epididymal cyst.  Also patient was complaining of dysuria and lower urinary tract symptoms.  Urology treated with antibiotics and anti-inflammatories.  Patient states the anti-inflammatories did not help much.  Nor did the antibiotics.  He was advised to contact urology as urology note indicates other treatment modalities can be considered.  Patient states the pain is not significant on heavy lifting.  More if he hits the area or there is just an ongoing dull ache.    Rash on forearms secondary to being in the sun and on antibiotic.        Review of Systems   Constitutional:  Negative for activity change, chills, fatigue and fever.   HENT:  Negative for congestion.    Eyes:  Negative for discharge.   Respiratory:  Negative for cough, chest tightness and shortness of breath.    Cardiovascular:  Negative for chest pain, palpitations and leg swelling.   Gastrointestinal:  Negative for abdominal pain.   Genitourinary:  Positive for genital sores and testicular pain. Negative for difficulty urinating.   Musculoskeletal:  Negative for arthralgias and myalgias.   Skin:  Negative for rash.   Allergic/Immunologic: Negative for immunocompromised state.   Neurological:  Negative for dizziness, syncope, weakness, light-headedness and headaches.   Hematological:  Negative for adenopathy. Does not bruise/bleed  "easily.   Psychiatric/Behavioral:  Negative for dysphoric mood. The patient is not nervous/anxious.        Objective     /80 (BP Location: Left arm, Patient Position: Sitting, Cuff Size: Standard)   Pulse 72   Temp 98 °F (36.7 °C) (Tympanic)   Resp 18   Ht 5' 9\" (1.753 m)   Wt 83.6 kg (184 lb 3.2 oz)   SpO2 98%   BMI 27.20 kg/m²     Physical Exam  Vitals and nursing note reviewed.   Constitutional:       General: He is not in acute distress.     Appearance: Normal appearance. He is well-developed. He is not ill-appearing.   HENT:      Head: Normocephalic.   Eyes:      Extraocular Movements: Extraocular movements intact.      Pupils: Pupils are equal, round, and reactive to light.   Neck:      Thyroid: No thyromegaly.      Vascular: No JVD.   Abdominal:      Palpations: Abdomen is soft.   Musculoskeletal:      Right lower leg: No edema.      Left lower leg: No edema.   Skin:     General: Skin is warm and dry.      Findings: No rash.   Neurological:      General: No focal deficit present.      Mental Status: He is alert and oriented to person, place, and time. Mental status is at baseline.   Psychiatric:         Mood and Affect: Mood normal.         Behavior: Behavior normal.         Thought Content: Thought content normal.       Administrative Statements           "

## 2024-08-22 ENCOUNTER — OFFICE VISIT (OUTPATIENT)
Dept: UROLOGY | Facility: CLINIC | Age: 19
End: 2024-08-22
Payer: COMMERCIAL

## 2024-08-22 VITALS
HEART RATE: 94 BPM | BODY MASS INDEX: 26.66 KG/M2 | WEIGHT: 180 LBS | SYSTOLIC BLOOD PRESSURE: 128 MMHG | DIASTOLIC BLOOD PRESSURE: 80 MMHG | OXYGEN SATURATION: 99 % | HEIGHT: 69 IN | TEMPERATURE: 98.2 F

## 2024-08-22 DIAGNOSIS — R30.0 DYSURIA: ICD-10-CM

## 2024-08-22 DIAGNOSIS — N50.811 TESTICULAR PAIN, RIGHT: Primary | ICD-10-CM

## 2024-08-22 DIAGNOSIS — R10.2 PELVIC AND PERINEAL PAIN: ICD-10-CM

## 2024-08-22 LAB — POST-VOID RESIDUAL VOLUME, ML POC: 6 ML

## 2024-08-22 PROCEDURE — 51798 US URINE CAPACITY MEASURE: CPT | Performed by: PHYSICIAN ASSISTANT

## 2024-08-22 PROCEDURE — 99213 OFFICE O/P EST LOW 20 MIN: CPT | Performed by: PHYSICIAN ASSISTANT

## 2024-08-22 NOTE — PROGRESS NOTES
8/22/2024      Chief Complaint   Patient presents with    Follow-up         Assessment and Plan    Right testicular pain  2.   Epididymal cyst  - US scrotum/testicles from 6/16/24 - Corresponding to the palpable abnormality is a epididymal head cyst measuring 1.0 x 1.5 x 0.9 cm. Normal testicles.  - CT A/P negative.   - Treated with 2 week course of Naproxen with ongoing symptoms after completion of course. Also c/o pelvic discomfort intermittently   - Continue scrotal elevation/supportive underwear     3. Dysuria/LUTS  - C/o penile discharge and weak urinary stream as well  - STD testing negative  - Treated with empiric course of Doxycycline and pyridium   - Uroflow today: Qmax 7 ml/s, voided volume only 30 mL. PVR 6 mL   - Continue adequate hydration and avoiding bladder irritants  - Ongoing symptoms despite above.     - Discussed trial of pelvic floor PT which he is agreeable. He wishes to proceed with further work-up with cystoscopy to r/o stricture and additionally wishes to proceed with right spermatic cord block.      History of Present Illness  Dixon Buck is a 19 y.o. male here for follow up evaluation of right testicular pain and lower urinary tract symptoms. Urine testing and STD testing negative. US negative aside from small epididymal cyst. He was treated with NSAID and doxycycline course. Presents today for reassessment. He notes symptoms improved somewhat when on this treatment however symptoms returned are at baseline. Also complaining of intermittent pelvic discomfort.     Review of Systems   Constitutional:  Negative for chills and fever.   Respiratory:  Negative for shortness of breath.    Cardiovascular:  Negative for chest pain.   Gastrointestinal:  Negative for abdominal pain.   Genitourinary:  Positive for difficulty urinating, dysuria and testicular pain. Negative for flank pain, frequency, genital sores, hematuria, penile discharge, penile pain, penile swelling, scrotal swelling and  urgency.   Neurological:  Negative for dizziness.         Past Medical History  History reviewed. No pertinent past medical history.    Past Social History  History reviewed. No pertinent surgical history.  Social History     Tobacco Use   Smoking Status Never    Passive exposure: Never   Smokeless Tobacco Never       Past Family History  Family History   Problem Relation Age of Onset    No Known Problems Father     No Known Problems Mother     No Known Problems Brother     Colon cancer Neg Hx     Breast cancer Neg Hx     Prostate cancer Neg Hx        Past Social history  Social History     Socioeconomic History    Marital status: Single     Spouse name: Not on file    Number of children: Not on file    Years of education: Not on file    Highest education level: Not on file   Occupational History    Not on file   Tobacco Use    Smoking status: Never     Passive exposure: Never    Smokeless tobacco: Never   Vaping Use    Vaping status: Never Used   Substance and Sexual Activity    Alcohol use: Yes     Comment: ocass    Drug use: Yes     Types: Marijuana    Sexual activity: Yes     Partners: Female   Other Topics Concern    Not on file   Social History Narrative    Girlfriend    Brushes teeth twice daily    Hobbies-computers, riding dirt bikes     Social Determinants of Health     Financial Resource Strain: Not on file   Food Insecurity: Not on file   Transportation Needs: Not on file   Physical Activity: Not on file   Stress: Not on file   Social Connections: Not on file   Intimate Partner Violence: Not on file   Housing Stability: Not on file       Current Medications  Current Outpatient Medications   Medication Sig Dispense Refill    ibuprofen (MOTRIN) 600 mg tablet Take 1 tablet (600 mg total) by mouth every 8 (eight) hours as needed for moderate pain 30 tablet 0    Multiple Vitamins-Minerals (AIRBORNE PO) Take by mouth      naproxen (Naprosyn) 500 mg tablet Take 1 tablet (500 mg total) by mouth 2 (two) times a  "day with meals for 14 days (Patient taking differently: Take 500 mg by mouth 2 (two) times a day with meals As needed) 28 tablet 0    phenazopyridine (PYRIDIUM) 100 mg tablet Take 1 tablet (100 mg total) by mouth 3 (three) times a day as needed for bladder spasms (Patient not taking: Reported on 8/8/2024) 10 tablet 0     No current facility-administered medications for this visit.       Allergies  No Known Allergies      The following portions of the patient's history were reviewed and updated as appropriate: allergies, current medications, past medical history, past social history, past surgical history and problem list.      Vitals  Vitals:    08/22/24 1049   BP: 128/80   Pulse: 94   Temp: 98.2 °F (36.8 °C)   TempSrc: Temporal   SpO2: 99%   Weight: 81.6 kg (180 lb)   Height: 5' 9\" (1.753 m)           Physical Exam  Physical Exam  Constitutional:       Appearance: Normal appearance.   HENT:      Head: Normocephalic and atraumatic.      Right Ear: External ear normal.      Left Ear: External ear normal.      Nose: Nose normal.   Eyes:      General: No scleral icterus.     Conjunctiva/sclera: Conjunctivae normal.   Cardiovascular:      Pulses: Normal pulses.   Pulmonary:      Effort: Pulmonary effort is normal.   Musculoskeletal:         General: Normal range of motion.      Cervical back: Normal range of motion.   Neurological:      General: No focal deficit present.      Mental Status: He is alert and oriented to person, place, and time.   Psychiatric:         Mood and Affect: Mood normal.         Behavior: Behavior normal.         Thought Content: Thought content normal.         Judgment: Judgment normal.           Results  Recent Results (from the past 1 hour(s))   POCT Measure PVR    Collection Time: 08/22/24 10:50 AM   Result Value Ref Range    POST-VOID RESIDUAL VOLUME, ML POC 6 mL   ]  No results found for: \"PSA\"  Lab Results   Component Value Date    CALCIUM 9.6 06/20/2024    K 4.3 06/20/2024    CO2 28 " 06/20/2024     06/20/2024    BUN 14 06/20/2024    CREATININE 0.96 06/20/2024     Lab Results   Component Value Date    WBC 5.03 06/20/2024    HGB 14.8 06/20/2024    HCT 42.8 06/20/2024    MCV 87 06/20/2024     06/20/2024           Orders  Orders Placed This Encounter   Procedures    POCT Measure PVR       Monique Sparrow

## 2024-12-06 ENCOUNTER — APPOINTMENT (OUTPATIENT)
Age: 19
End: 2024-12-06
Payer: COMMERCIAL

## 2024-12-06 DIAGNOSIS — Z01.812 PRE-OPERATIVE LABORATORY EXAMINATION: ICD-10-CM

## 2024-12-06 LAB
ANION GAP SERPL CALCULATED.3IONS-SCNC: 6 MMOL/L (ref 4–13)
BUN SERPL-MCNC: 16 MG/DL (ref 5–25)
CALCIUM SERPL-MCNC: 9.3 MG/DL (ref 8.4–10.2)
CHLORIDE SERPL-SCNC: 107 MMOL/L (ref 96–108)
CO2 SERPL-SCNC: 26 MMOL/L (ref 21–32)
CREAT SERPL-MCNC: 1.06 MG/DL (ref 0.6–1.3)
GFR SERPL CREATININE-BSD FRML MDRD: 99 ML/MIN/1.73SQ M
GLUCOSE P FAST SERPL-MCNC: 88 MG/DL (ref 65–99)
POTASSIUM SERPL-SCNC: 4.4 MMOL/L (ref 3.5–5.3)
SODIUM SERPL-SCNC: 139 MMOL/L (ref 135–147)

## 2024-12-06 PROCEDURE — 80048 BASIC METABOLIC PNL TOTAL CA: CPT

## 2024-12-06 PROCEDURE — 36415 COLL VENOUS BLD VENIPUNCTURE: CPT
